# Patient Record
(demographics unavailable — no encounter records)

---

## 2025-03-02 NOTE — SOCIAL HISTORY
[# of Members in Household ___] : [unfilled]'s household currently consist of [unfilled] member(s) [] : she is  [Lives in home of family member/friend] : living in the home of family member/friend [Employed] : unemployed [Social Stress in Home] : reports no social stress in the home [Domestic Violence] : reports no domestic violence [Abuse] : reports no other abuse [Cultural/Spiritual Issues] : no cultural/spiritual issues [FreeTextEntry1] : Pt resides with son and daughter in law

## 2025-03-02 NOTE — PSYCHOSOCIAL ASSESSMENT
[Satified with Pain Relief] : The patient is satisfied with pain relief [Recent decline in ADLs and IADLS] : recent decline in ADLs and IADLs [Reviewed Home Safety Evaluation] : Reviewed home safety evaluation [Calm] : calm [Engaged] : engaged [Alert] : ~L alert [Oriented To Place] : ~L oriented to place [Oriented To Time] : ~L oriented to time [Oriented To Person] : ~L oriented to person [Oriented To Situation] : ~L oriented to situation [Expresses thoughts/feelings/needs without difficulty] : Expresses thoughts/feelings/needs without difficulty [Complex Plan of Care/Treatments/Medications] : complex plan of care/treatments/medications [Social Security] : social security [Medicaid Status Discussed (see flowsheet)] : medicaid status discussed [Community support currently involved] : community support currently involved [Community Transport Services] : community transport services [Reports changes in hearing] : reports no changes in hearing [Reports changes in vision] : Reports no changes in vision [History Physical/Sexual Abuse] : no history of physical/sexual abuse [History of Mental Illness] : no history of mental illness [History of Living In A Foster Home] : no history of living in foster home [Significant Loss of Love One] : no significant loss of love one(s) [Non-responsive] : responsiveness  [Over the Past 2 Weeks, Have You Felt Down, Depressed, or Hopeless?] : 1.) Over the past 2 weeks, have you felt down, depressed, or hopeless? No [Over the Past 2 Weeks, Have You Felt Little Interest or Pleasure Doing Things?] : 2.) Over the past 2 weeks, have you felt little interest or pleasure doing things? No [Cognitive Mini-Cog Performed (see flowsheet)] : a mini-cog ~T ~C was not performed [de-identified] : Pt report minimal pain  [de-identified] : Pt reports hearing loss in R Ear  [de-identified] : Pt uses glasses from pharmacy  [de-identified] : Pt denies depression  [de-identified] : In place

## 2025-03-02 NOTE — SURGICAL HISTORY
[PSH Reviewed and Updated] : past surgical history reviewed and updated [de-identified] : No surgery in the past

## 2025-03-02 NOTE — FAMILY HISTORY
[Family History Reviewed and Updated] : family history reviewed and updated [de-identified] : 3 kids

## 2025-03-02 NOTE — SURGICAL HISTORY
[PSH Reviewed and Updated] : past surgical history reviewed and updated [de-identified] : No surgery in the past

## 2025-03-02 NOTE — HISTORY OF PRESENT ILLNESS
[Family Member] : family member [In-Place] : has Home Health services in-place [Patient is stable] : patient is stable [FreeTextEntry1] : Left hemiparesis secondary to CVA [FreeTextEntry2] : PMH:H 65 year-old male with PMHx of Allergies, HTN, HLD, DM, GERD, Gout, A-fib, hypothyroidism, history of CVA- left hemiparesis secondary to a stroke, exhibiting left upper extremity paralysis and impaired left lower extremity motor function, resulting in a circumductive gait characterized by dragging of the left food during ambulation insomnia.   Interval Events: Pt found alert in no apparent distress  Maltese speaking, daughter in law present at time  As per DTR in law pt is non-compliant with medical appointment Cardiologist referral made for pt    Medical Issues: DM tradjenta HTN Metoprolol, amlodipine, spironolactone Gout Cochline HLD:Atorvastatin Hypothyroidism levothyroxine Gerd Pantoprazole  A-fib Eliquis, Entresto insomina   Specialists: N/A   Social hx: Former drinker  Caregivers:  Subjective:  -Appetite/weight: appetite is good. Weight is stable.  -Gait/falls: Gait is unstable. No falls  -Pain: Left shoulder pain. Does not take any pain medication  -Sleep: does not sleep-  -BMs: regular no straining.  -Urine: no issue.  -Skin: intact  -DME: hospital bed, cane   -Mood/memory: mood is good.   -Communication: conversational, pleasant. minimal medical literacy  -Health Maintenance: Does not take any vaccine, besides the covid shot in 2020  -Hospitalizations in the past year: 1 month ago due to hyperglycemia at [facility]. 5 years ago anastacia to CVA   MOLST: Will discuss with son/ Mark the health care proxy

## 2025-03-02 NOTE — PHYSICAL EXAM
[No Acute Distress] : no acute distress [Normal Sclera/Conjunctiva] : normal sclera/conjunctiva [EOMI] : extra ocular movement intact [Normal Outer Ear/Nose] : the ears and nose were normal in appearance [Normal Oropharynx] : the oropharynx was normal [No Respiratory Distress] : no respiratory distress [Clear to Auscultation] : lungs were clear to auscultation bilaterally [No Accessory Muscle Use] : no accessory muscle use [Normal Rate] : heart rate was normal  [Regular Rhythm] : with a regular rhythm [Normal S1, S2] : normal S1 and S2 [No Murmurs] : no murmurs heard [No Edema] : there was no peripheral edema [Normal Bowel Sounds] : normal bowel sounds [Non Tender] : non-tender [Soft] : abdomen soft [Not Distended] : not distended [Normal Post Cervical Nodes] : no posterior cervical lymphadenopathy [Normal Anterior Cervical Nodes] : no anterior cervical lymphadenopathy [No CVA Tenderness] : no ~M costovertebral angle tenderness [No Spinal Tenderness] : no spinal tenderness [Normal Strength/Tone] : muscle strength and tone were normal [No Rash] : no rash [Oriented x3] : oriented to person, place, and time [Normal Affect] : the affect was normal [de-identified] : Left hemiperesis

## 2025-03-02 NOTE — FAMILY HISTORY
[Family History Reviewed and Updated] : family history reviewed and updated [de-identified] : 3 kids

## 2025-03-02 NOTE — HEALTH RISK ASSESSMENT
[Some assistance needed] : walking [Independent] : using telephone [Full assistance needed] : managing finances [No falls in past year] : Patient reported no falls in the past year [No] : The patient does not have visual impairment [TimeGetUpGo] : 5

## 2025-03-02 NOTE — PHYSICAL EXAM
[No Acute Distress] : no acute distress [Normal Sclera/Conjunctiva] : normal sclera/conjunctiva [EOMI] : extra ocular movement intact [Normal Outer Ear/Nose] : the ears and nose were normal in appearance [Normal Oropharynx] : the oropharynx was normal [No Respiratory Distress] : no respiratory distress [Clear to Auscultation] : lungs were clear to auscultation bilaterally [No Accessory Muscle Use] : no accessory muscle use [Normal Rate] : heart rate was normal  [Regular Rhythm] : with a regular rhythm [Normal S1, S2] : normal S1 and S2 [No Murmurs] : no murmurs heard [No Edema] : there was no peripheral edema [Normal Bowel Sounds] : normal bowel sounds [Non Tender] : non-tender [Soft] : abdomen soft [Not Distended] : not distended [Normal Post Cervical Nodes] : no posterior cervical lymphadenopathy [Normal Anterior Cervical Nodes] : no anterior cervical lymphadenopathy [No CVA Tenderness] : no ~M costovertebral angle tenderness [No Spinal Tenderness] : no spinal tenderness [Normal Strength/Tone] : muscle strength and tone were normal [No Rash] : no rash [Oriented x3] : oriented to person, place, and time [Normal Affect] : the affect was normal [de-identified] : Left hemiperesis

## 2025-03-02 NOTE — PSYCHOSOCIAL ASSESSMENT
[Satified with Pain Relief] : The patient is satisfied with pain relief [Recent decline in ADLs and IADLS] : recent decline in ADLs and IADLs [Reviewed Home Safety Evaluation] : Reviewed home safety evaluation [Calm] : calm [Engaged] : engaged [Alert] : ~L alert [Oriented To Place] : ~L oriented to place [Oriented To Time] : ~L oriented to time [Oriented To Person] : ~L oriented to person [Oriented To Situation] : ~L oriented to situation [Expresses thoughts/feelings/needs without difficulty] : Expresses thoughts/feelings/needs without difficulty [Complex Plan of Care/Treatments/Medications] : complex plan of care/treatments/medications [Social Security] : social security [Medicaid Status Discussed (see flowsheet)] : medicaid status discussed [Community support currently involved] : community support currently involved [Community Transport Services] : community transport services [Reports changes in hearing] : reports no changes in hearing [Reports changes in vision] : Reports no changes in vision [History Physical/Sexual Abuse] : no history of physical/sexual abuse [History of Mental Illness] : no history of mental illness [History of Living In A Foster Home] : no history of living in foster home [Significant Loss of Love One] : no significant loss of love one(s) [Non-responsive] : responsiveness  [Over the Past 2 Weeks, Have You Felt Down, Depressed, or Hopeless?] : 1.) Over the past 2 weeks, have you felt down, depressed, or hopeless? No [Over the Past 2 Weeks, Have You Felt Little Interest or Pleasure Doing Things?] : 2.) Over the past 2 weeks, have you felt little interest or pleasure doing things? No [Cognitive Mini-Cog Performed (see flowsheet)] : a mini-cog ~T ~C was not performed [de-identified] : Pt report minimal pain  [de-identified] : Pt reports hearing loss in R Ear  [de-identified] : Pt uses glasses from pharmacy  [de-identified] : Pt denies depression  [de-identified] : In place

## 2025-03-02 NOTE — HISTORY OF PRESENT ILLNESS
[Family Member] : family member [In-Place] : has Home Health services in-place [Patient is stable] : patient is stable [FreeTextEntry1] : Left hemiparesis secondary to CVA [FreeTextEntry2] : PMH:H 65 year-old male with PMHx of Allergies, HTN, HLD, DM, GERD, Gout, A-fib, hypothyroidism, history of CVA- left hemiparesis secondary to a stroke, exhibiting left upper extremity paralysis and impaired left lower extremity motor function, resulting in a circumductive gait characterized by dragging of the left food during ambulation insomnia.   Interval Events: Pt found alert in no apparent distress  Danish speaking, daughter in law present at time  As per DTR in law pt is non-compliant with medical appointment Cardiologist referral made for pt    Medical Issues: DM tradjenta HTN Metoprolol, amlodipine, spironolactone Gout Cochline HLD:Atorvastatin Hypothyroidism levothyroxine Gerd Pantoprazole  A-fib Eliquis, Entresto insomina   Specialists: N/A   Social hx: Former drinker  Caregivers:  Subjective:  -Appetite/weight: appetite is good. Weight is stable.  -Gait/falls: Gait is unstable. No falls  -Pain: Left shoulder pain. Does not take any pain medication  -Sleep: does not sleep-  -BMs: regular no straining.  -Urine: no issue.  -Skin: intact  -DME: hospital bed, cane   -Mood/memory: mood is good.   -Communication: conversational, pleasant. minimal medical literacy  -Health Maintenance: Does not take any vaccine, besides the covid shot in 2020  -Hospitalizations in the past year: 1 month ago due to hyperglycemia at [facility]. 5 years ago anastacia to CVA   MOLST: Will discuss with son/ Mark the health care proxy

## 2025-04-20 NOTE — PHYSICAL EXAM
Patient is called and asked about her eye and when all started.  States two weeks ago started  Seemed to be getting a bit better then got wose last night.  Has a very swollen eye lid and it has fluid starting under it and painfyl and red.  No fever or other symptoms noted  She is referred to go to walk in care today to have this looked at or call her pcp for direction or antibiotic   [No Acute Distress] : no acute distress [Normal Sclera/Conjunctiva] : normal sclera/conjunctiva [EOMI] : extra ocular movement intact [Normal Outer Ear/Nose] : the ears and nose were normal in appearance [Normal Oropharynx] : the oropharynx was normal [No Respiratory Distress] : no respiratory distress [Clear to Auscultation] : lungs were clear to auscultation bilaterally [No Accessory Muscle Use] : no accessory muscle use [Normal Rate] : heart rate was normal  [Regular Rhythm] : with a regular rhythm [Normal S1, S2] : normal S1 and S2 [No Murmurs] : no murmurs heard [No Edema] : there was no peripheral edema [Normal Bowel Sounds] : normal bowel sounds [Non Tender] : non-tender [Soft] : abdomen soft [Not Distended] : not distended [Normal Post Cervical Nodes] : no posterior cervical lymphadenopathy [Normal Anterior Cervical Nodes] : no anterior cervical lymphadenopathy [No CVA Tenderness] : no ~M costovertebral angle tenderness [No Spinal Tenderness] : no spinal tenderness [Normal Strength/Tone] : muscle strength and tone were normal [No Rash] : no rash [Oriented x3] : oriented to person, place, and time [Normal Affect] : the affect was normal [de-identified] : unsteady gait

## 2025-04-20 NOTE — REASON FOR VISIT
[Post-hospitalization from ___ Hospital] : Post-hospitalization from [unfilled] Hospital [Admitted on: ___] : The patient was admitted on [unfilled] [Discharged on ___] : discharged on [unfilled] [Family Member] : family member [FreeTextEntry2] : Pt was hospitalized at [place] for CHF exacerbation, received diuresis had an echocardiogram. pt was diagnosed with pulmonary hypertension Discharge medications from the discharge summary were reviewed and reconciled with the current medication list and medications in home.  [FreeTextEntry4] : chart review

## 2025-04-20 NOTE — PHYSICAL EXAM
[No Acute Distress] : no acute distress [Normal Sclera/Conjunctiva] : normal sclera/conjunctiva [EOMI] : extra ocular movement intact [Normal Outer Ear/Nose] : the ears and nose were normal in appearance [Normal Oropharynx] : the oropharynx was normal [No Respiratory Distress] : no respiratory distress [Clear to Auscultation] : lungs were clear to auscultation bilaterally [No Accessory Muscle Use] : no accessory muscle use [Normal Rate] : heart rate was normal  [Regular Rhythm] : with a regular rhythm [Normal S1, S2] : normal S1 and S2 [No Murmurs] : no murmurs heard [No Edema] : there was no peripheral edema [Normal Bowel Sounds] : normal bowel sounds [Non Tender] : non-tender [Soft] : abdomen soft [Not Distended] : not distended [Normal Post Cervical Nodes] : no posterior cervical lymphadenopathy [Normal Anterior Cervical Nodes] : no anterior cervical lymphadenopathy [No CVA Tenderness] : no ~M costovertebral angle tenderness [No Spinal Tenderness] : no spinal tenderness [Normal Strength/Tone] : muscle strength and tone were normal [No Rash] : no rash [Oriented x3] : oriented to person, place, and time [Normal Affect] : the affect was normal [de-identified] : unsteady gait

## 2025-04-20 NOTE — PHYSICAL EXAM
[No Acute Distress] : no acute distress [Normal Sclera/Conjunctiva] : normal sclera/conjunctiva [EOMI] : extra ocular movement intact [Normal Outer Ear/Nose] : the ears and nose were normal in appearance [Normal Oropharynx] : the oropharynx was normal [No Respiratory Distress] : no respiratory distress [Clear to Auscultation] : lungs were clear to auscultation bilaterally [No Accessory Muscle Use] : no accessory muscle use [Normal Rate] : heart rate was normal  [Regular Rhythm] : with a regular rhythm [Normal S1, S2] : normal S1 and S2 [No Murmurs] : no murmurs heard [No Edema] : there was no peripheral edema [Normal Bowel Sounds] : normal bowel sounds [Non Tender] : non-tender [Soft] : abdomen soft [Not Distended] : not distended [Normal Post Cervical Nodes] : no posterior cervical lymphadenopathy [Normal Anterior Cervical Nodes] : no anterior cervical lymphadenopathy [No CVA Tenderness] : no ~M costovertebral angle tenderness [No Spinal Tenderness] : no spinal tenderness [Normal Strength/Tone] : muscle strength and tone were normal [No Rash] : no rash [Oriented x3] : oriented to person, place, and time [Normal Affect] : the affect was normal [de-identified] : unsteady gait

## 2025-04-20 NOTE — REASON FOR VISIT
[Post-hospitalization from ___ Hospital] : Post-hospitalization from [unfilled] Hospital [Admitted on: ___] : The patient was admitted on [unfilled] [Discharged on ___] : discharged on [unfilled] [Family Member] : family member [FreeTextEntry2] : Pt was hospitalized at [place] for CHF exacerbation, received diuresis had an echocardiogram. pt was diagnosed with pulmonary hypertension Discharge medications from the discharge summary were reviewed and reconciled with the current medication list and medications in home.  [FreeTextEntry4] : chart review

## 2025-04-20 NOTE — REASON FOR VISIT
[Post-hospitalization from ___ Hospital] : Post-hospitalization from [unfilled] Hospital [Admitted on: ___] : The patient was admitted on [unfilled] [Discharged on ___] : discharged on [unfilled] [Family Member] : family member [FreeTextEntry2] : Pt was hospitalized at [place] for CHF exacerbation, received diuresis had an echocardiogram. pt was diagnosed with pulmonary hypertension Discharge medications from the discharge summary were reviewed and reconciled with the current medication list and medications in home.  [FreeTextEntry4] : chart review

## 2025-04-20 NOTE — HISTORY OF PRESENT ILLNESS
[Family Member] : family member [In-Place] : has Home Health services in-place [Patient is stable] : patient is stable [FreeTextEntry1] : Left hemiparesis secondary to CVA [FreeTextEntry2] : PMH:H 65 year-old male with PMHx of Allergies, HTN, HLD, DM, GERD, Gout, A-fib, hypothyroidism, history of CVA- left hemiparesis secondary to a stroke, exhibiting left upper extremity paralysis and impaired left lower extremity motor function, resulting in a circumductive gait characterized by dragging of the left food during ambulation insomnia.   Interval Events: Pt found alert in no apparent distress  Taiwanese speaking, daughter in law present at time  As per DTR in law pt is non-compliant with medical appointment Cardiologist referral made for pt    Medical Issues: DM : Basaglar kwikpen  HTN Metoprolol, amlodipine, spironolactone BPH: Finasteride, tamsulosin Gout Cochline HLD:Atorvastatin Hypothyroidism levothyroxine Gerd Pantoprazole  A-fib Eliquis, Entresto insomnia: Melatonin   Specialists: N/A   Social hx: Former drinker  Caregivers:  Subjective:  -Appetite/weight: appetite is good. Weight is stable.  -Gait/falls: Gait is unstable. No falls  -Pain: Left shoulder pain. Does not take any pain medication  -Sleep: does not sleep-  -BMs: regular no straining.  -Urine: no issue.  -Skin: intact  -DME: hospital bed, cane   -Mood/memory: mood is good.   -Communication: conversational, pleasant. minimal medical literacy  -Health Maintenance: Does not take any vaccine, besides the covid shot in 2020  -Hospitalizations in the past year: 1 month ago due to hyperglycemia at [facility]. 5 years ago anastacia to CVA   MOLST: Will discuss with son/ Mark the health care proxy

## 2025-04-20 NOTE — HISTORY OF PRESENT ILLNESS
[Family Member] : family member [In-Place] : has Home Health services in-place [Patient is stable] : patient is stable [FreeTextEntry1] : Left hemiparesis secondary to CVA [FreeTextEntry2] : PMH:H 65 year-old male with PMHx of Allergies, HTN, HLD, DM, GERD, Gout, A-fib, hypothyroidism, history of CVA- left hemiparesis secondary to a stroke, exhibiting left upper extremity paralysis and impaired left lower extremity motor function, resulting in a circumductive gait characterized by dragging of the left food during ambulation insomnia.   Interval Events: Pt found alert in no apparent distress  Burmese speaking, daughter in law present at time  As per DTR in law pt is non-compliant with medical appointment Cardiologist referral made for pt    Medical Issues: DM : Basaglar kwikpen  HTN Metoprolol, amlodipine, spironolactone BPH: Finasteride, tamsulosin Gout Cochline HLD:Atorvastatin Hypothyroidism levothyroxine Gerd Pantoprazole  A-fib Eliquis, Entresto insomnia: Melatonin   Specialists: N/A   Social hx: Former drinker  Caregivers:  Subjective:  -Appetite/weight: appetite is good. Weight is stable.  -Gait/falls: Gait is unstable. No falls  -Pain: Left shoulder pain. Does not take any pain medication  -Sleep: does not sleep-  -BMs: regular no straining.  -Urine: no issue.  -Skin: intact  -DME: hospital bed, cane   -Mood/memory: mood is good.   -Communication: conversational, pleasant. minimal medical literacy  -Health Maintenance: Does not take any vaccine, besides the covid shot in 2020  -Hospitalizations in the past year: 1 month ago due to hyperglycemia at [facility]. 5 years ago anastacia to CVA   MOLST: Will discuss with son/ Mark the health care proxy

## 2025-04-20 NOTE — HISTORY OF PRESENT ILLNESS
[Family Member] : family member [In-Place] : has Home Health services in-place [Patient is stable] : patient is stable [FreeTextEntry1] : Left hemiparesis secondary to CVA [FreeTextEntry2] : PMH:H 65 year-old male with PMHx of Allergies, HTN, HLD, DM, GERD, Gout, A-fib, hypothyroidism, history of CVA- left hemiparesis secondary to a stroke, exhibiting left upper extremity paralysis and impaired left lower extremity motor function, resulting in a circumductive gait characterized by dragging of the left food during ambulation insomnia.   Interval Events: Pt found alert in no apparent distress  Portuguese speaking, daughter in law present at time  As per DTR in law pt is non-compliant with medical appointment Cardiologist referral made for pt    Medical Issues: DM : Basaglar kwikpen  HTN Metoprolol, amlodipine, spironolactone BPH: Finasteride, tamsulosin Gout Cochline HLD:Atorvastatin Hypothyroidism levothyroxine Gerd Pantoprazole  A-fib Eliquis, Entresto insomnia: Melatonin   Specialists: N/A   Social hx: Former drinker  Caregivers:  Subjective:  -Appetite/weight: appetite is good. Weight is stable.  -Gait/falls: Gait is unstable. No falls  -Pain: Left shoulder pain. Does not take any pain medication  -Sleep: does not sleep-  -BMs: regular no straining.  -Urine: no issue.  -Skin: intact  -DME: hospital bed, cane   -Mood/memory: mood is good.   -Communication: conversational, pleasant. minimal medical literacy  -Health Maintenance: Does not take any vaccine, besides the covid shot in 2020  -Hospitalizations in the past year: 1 month ago due to hyperglycemia at [facility]. 5 years ago anastacia to CVA   MOLST: Will discuss with son/ Mark the health care proxy